# Patient Record
Sex: MALE | Race: WHITE | NOT HISPANIC OR LATINO
[De-identification: names, ages, dates, MRNs, and addresses within clinical notes are randomized per-mention and may not be internally consistent; named-entity substitution may affect disease eponyms.]

---

## 2021-05-04 PROBLEM — Z00.00 ENCOUNTER FOR PREVENTIVE HEALTH EXAMINATION: Status: ACTIVE | Noted: 2021-05-04

## 2021-05-11 ENCOUNTER — LABORATORY RESULT (OUTPATIENT)
Age: 51
End: 2021-05-11

## 2021-05-11 ENCOUNTER — APPOINTMENT (OUTPATIENT)
Dept: SURGERY | Facility: CLINIC | Age: 51
End: 2021-05-11
Payer: COMMERCIAL

## 2021-05-11 VITALS
SYSTOLIC BLOOD PRESSURE: 120 MMHG | TEMPERATURE: 97.8 F | OXYGEN SATURATION: 98 % | DIASTOLIC BLOOD PRESSURE: 82 MMHG | WEIGHT: 231 LBS | HEART RATE: 66 BPM | BODY MASS INDEX: 33.07 KG/M2 | HEIGHT: 70 IN

## 2021-05-11 DIAGNOSIS — Z82.69 FAMILY HISTORY OF OTHER DISEASES OF THE MUSCULOSKELETAL SYSTEM AND CONNECTIVE TISSUE: ICD-10-CM

## 2021-05-11 DIAGNOSIS — Z78.9 OTHER SPECIFIED HEALTH STATUS: ICD-10-CM

## 2021-05-11 DIAGNOSIS — Z82.49 FAMILY HISTORY OF ISCHEMIC HEART DISEASE AND OTHER DISEASES OF THE CIRCULATORY SYSTEM: ICD-10-CM

## 2021-05-11 DIAGNOSIS — L72.3 SEBACEOUS CYST: ICD-10-CM

## 2021-05-11 DIAGNOSIS — J45.909 UNSPECIFIED ASTHMA, UNCOMPLICATED: ICD-10-CM

## 2021-05-11 DIAGNOSIS — L08.9 SEBACEOUS CYST: ICD-10-CM

## 2021-05-11 DIAGNOSIS — Z84.89 FAMILY HISTORY OF OTHER SPECIFIED CONDITIONS: ICD-10-CM

## 2021-05-11 DIAGNOSIS — L73.2 HIDRADENITIS SUPPURATIVA: ICD-10-CM

## 2021-05-11 PROCEDURE — 99242 OFF/OP CONSLTJ NEW/EST SF 20: CPT

## 2021-05-11 PROCEDURE — 99072 ADDL SUPL MATRL&STAF TM PHE: CPT

## 2021-05-11 NOTE — ASSESSMENT
[FreeTextEntry1] : After examination patient was explained about infected sebaceous cyst.  He was also explained about hidradenitis for which preventive treatment was explained in detail.  For infected sebaceous cyst patient was explained about the etiology and pathology in detail.  Because the patient has infection in the sebaceous cyst he was advised to wait for few weeks before any definitive surgery to be done.  He was advised to return to the office in 6 weeks for possible surgical excision if needed.

## 2021-05-11 NOTE — PHYSICAL EXAM
[Alert] : alert [Oriented to Person] : oriented to person [Oriented to Place] : oriented to place [Oriented to Time] : oriented to time [Calm] : calm [de-identified] : Patient is in no acute distress. [de-identified] : In right axillary area some infected sweat glands are present.  In the anterior aspect infected sebaceous cyst is present.  The sebaceous cyst has some drainage cultures are taken.  Some cyst wall was also removed.

## 2021-05-11 NOTE — HISTORY OF PRESENT ILLNESS
[de-identified] : Patient presents to the office with a history of right axillary cystic mass that was infected originally about 6 years ago.  For the last 2 years he has been having some issues with that and some drainage.  Last year patient had infection and approximately a month ago he started having some drainage and infection in the right axillary region.

## 2021-05-11 NOTE — REASON FOR VISIT
[Initial Evaluation] : an initial evaluation [FreeTextEntry1] : cyst under arm drainage and odor pt on antibiotics

## 2021-05-11 NOTE — CONSULT LETTER
[Dear  ___] : Dear  [unfilled], [Please see my note below.] : Please see my note below. [Consult Closing:] : Thank you very much for allowing me to participate in the care of this patient.  If you have any questions, please do not hesitate to contact me. [Sincerely,] : Sincerely, [FreeTextEntry3] : Pernell Mckinney MD, FACS\par [facility]\[place] [address]\par

## 2021-06-22 ENCOUNTER — APPOINTMENT (OUTPATIENT)
Dept: SURGERY | Facility: CLINIC | Age: 51
End: 2021-06-22